# Patient Record
Sex: FEMALE | Race: WHITE | NOT HISPANIC OR LATINO | Employment: OTHER | URBAN - METROPOLITAN AREA
[De-identification: names, ages, dates, MRNs, and addresses within clinical notes are randomized per-mention and may not be internally consistent; named-entity substitution may affect disease eponyms.]

---

## 2017-01-10 ENCOUNTER — OFFICE VISIT (OUTPATIENT)
Dept: ONCOLOGY | Facility: CLINIC | Age: 67
End: 2017-01-10

## 2017-01-10 VITALS
DIASTOLIC BLOOD PRESSURE: 79 MMHG | HEIGHT: 67 IN | RESPIRATION RATE: 16 BRPM | WEIGHT: 150 LBS | BODY MASS INDEX: 23.54 KG/M2 | TEMPERATURE: 98.3 F | HEART RATE: 62 BPM | SYSTOLIC BLOOD PRESSURE: 157 MMHG

## 2017-01-10 DIAGNOSIS — C50.911 MALIGNANT NEOPLASM OF RIGHT FEMALE BREAST, UNSPECIFIED SITE OF BREAST: Primary | ICD-10-CM

## 2017-01-10 PROCEDURE — 99213 OFFICE O/P EST LOW 20 MIN: CPT | Performed by: INTERNAL MEDICINE

## 2017-01-10 RX ORDER — TRIAMCINOLONE ACETONIDE 55 UG/1
1 SPRAY, METERED NASAL DAILY
COMMUNITY
End: 2018-01-23 | Stop reason: ALTCHOICE

## 2017-01-10 NOTE — PROGRESS NOTES
"      PROBLEM LIST:  1. Right-sided Stage IA ([id number]) invasive ductal carcinoma, estrogen  receptor positive, progesterone receptor positive, HER-2 0+.   a) The patient presented with an abnormality on screening mammogram on  04/29/2015. On 05/05/2015 she had a biopsy which showed an invasive, low-grade  invasive ductal carcinoma, estrogen receptor positive, progesterone receptor  positive, HER-2 0+. On 05/27/2015 she underwent a right lumpectomy. Final  pathology showed a well differentiated invasive ductal cancer with tubular  features measuring 0.9 cm in size. No lymphovascular invasion. Zero of 1  sentinel lymph nodes was involved.   b) Anastrozole was started on 06/10/2015.   c) Hormonal therapy was changed to tamoxifen in 12/2015 due to joint symptoms  from anastrazole.    Subjective     HISTORY OF PRESENT ILLNESS:   Jia Mendoza returns for follow-up.  Since her last visit she has retired.  Shortly afterwards she fell and broke her wrist of her right hand.  She has been doing physical therapy and working on recovering from this.  She has been enjoying [employer].  Her  is planning on retiring this spring and they are looking forward to having some time together.  She had a mammogram last May and 12 month follow-up was recommended afterwards.  She is taking tamoxifen and tolerating it well.    Past Medical History, Past Surgical History, Social History, Family History have been reviewed and are without significant changes except as mentioned.    Review of Systems   A comprehensive 14 point review of systems was performed and was negative except as mentioned.    Medications:  The current medication list was reviewed in the EMR    ALLERGIES:    Allergies   Allergen Reactions   • Erythromycin        Objective      Visit Vitals   • /79   • Pulse 62   • Temp 98.3 °F (36.8 °C)   • Resp 16   • Ht 67\" (170.2 cm)   • Wt 150 lb (68 kg)   • BMI 23.49 kg/m2        Performance Status: 0    General: well " appearing, in no acute distress  HEENT: sclera anicteric, oropharynx clear  Lymphatics: no cervical, supraclavicular, or axillary adenopathy  Cardiovascular: regular rate and rhythm, no murmurs  Lungs: clear to auscultation bilaterally  Breast: Bilateral breast exam was performed.  There are no masses or lesions  Abdomen: soft, nontender, nondistended.  No palpable organomegaly  Extremeties: no lower extremity edema  Skin: no rashes, lesions, bruising, or petechiae          Assessment/Plan   Jia Mendoza is a 66-year-old female who returns for follow-up of stage I breast cancer.  She continues on tamoxifen which she is tolerating well without difficulty.  We will plan to continue this for a minimum of 5 years.  She will be due for repeat mammogram in May 2017.  She will notify me or Dr. Knapp if she does not hear from the breast center regarding scheduling this.  I will plan to see her back again in 6 months.                  Visit time was 15 minutes, greater than 50% spent in counseling      Ashlie Thompson MD  [facility] Hematology and Oncology    1/10/2017          CC:

## 2017-01-10 NOTE — MR AVS SNAPSHOT
Jia Mendoza   1/10/2017 10:15 AM   Office Visit    Dept Phone:  [phone]   Encounter #:  [id number]    Provider:  Ashlie Thompson MD   Department:  [facility] HEMATOLOGY  AND ONCOLOGY                Your Full Care Plan              Your Updated Medication List          This list is accurate as of: 1/10/17 11:19 AM.  Always use your most recent med list.                aspirin 81 MG EC tablet       METAMUCIL PO       tamoxifen 20 MG chemo tablet   Commonly known as:  NOLVADEX   TAKE 1 TABLET BY MOUTH DAILY       Triamcinolone Acetonide 55 MCG/ACT nasal inhaler   Commonly known as:  NASACORT       VITAMIN D PO       ZYRTEC PO               Instructions     None    Patient Instructions History      "[employer]hart Signup     [facility] [employer] allows you to send messages to your doctor, view your test results, renew your prescriptions, schedule appointments, and more. To sign up, go to [employer] and click on the Sign Up Now link in the New User? box. Enter your Infomous Activation Code exactly as it appears below along with the last four digits of your Social Security Number and your Date of Birth () to complete the sign-up process. If you do not sign up before the expiration date, you must request a new code.    Infomous Activation Code: [email or web address]  Expires: 2017 11:17 AM    If you have questions, you can email [email or web address] or call [phone] to talk to our Infomous staff. Remember, Infomous is NOT to be used for urgent needs. For medical emergencies, dial 911.               Other Info from Your Visit           Your appointments     Date & Time Provider Appointment Department    2017 11:00 AM EDT Ashlie Thompson MD FOLLOW UP [facility] HEMATOLOGY  AND ONCOLOGY        [facility] HEMATOLOGY  AND ONCOLOGY  [address], [address] [address]  [phone]           "  Vital Signs     Blood Pressure Pulse Temperature Respirations Height Weight    157/79 62 98.3 °F (36.8 °C) 16 67\" (170.2 cm) 150 lb (68 kg)    Body Mass Index Smoking Status                23.49 kg/m2 Never Smoker          Problems and Diagnoses Noted     Breast cancer        "

## 2017-03-06 ENCOUNTER — TRANSCRIBE ORDERS (OUTPATIENT)
Dept: ADMINISTRATIVE | Facility: HOSPITAL | Age: 67
End: 2017-03-06

## 2017-03-06 DIAGNOSIS — R92.8 ABNORMAL MAMMOGRAPHY: Primary | ICD-10-CM

## 2017-05-12 ENCOUNTER — HOSPITAL ENCOUNTER (OUTPATIENT)
Dept: MAMMOGRAPHY | Facility: HOSPITAL | Age: 67
Discharge: HOME | End: 2017-05-12
Attending: SURGERY | Admitting: SURGERY

## 2017-05-12 DIAGNOSIS — R92.8 ABNORMAL MAMMOGRAPHY: ICD-10-CM

## 2017-05-12 PROCEDURE — G0204 DX MAMMO INCL CAD BI: HCPCS | Performed by: RADIOLOGY

## 2017-05-12 PROCEDURE — G0204 DX MAMMO INCL CAD BI: HCPCS

## 2017-05-12 PROCEDURE — G0279 TOMOSYNTHESIS, MAMMO: HCPCS | Performed by: RADIOLOGY

## 2017-05-12 PROCEDURE — G0279 TOMOSYNTHESIS, MAMMO: HCPCS

## 2017-06-01 ENCOUNTER — TELEPHONE (OUTPATIENT)
Dept: ONCOLOGY | Facility: CLINIC | Age: 67
End: 2017-06-01

## 2017-06-01 NOTE — TELEPHONE ENCOUNTER
----- Message from Iram Ponce sent at 5/31/2017 12:56 PM EDT -----  Regarding: MICK THACKER  Contact: [phone]  PT CALLED AND SAID HER GYN WANTS TO PUT HER ON CALTRATE.    PLEASE CALL HER AND LET HER KNOW IF THIS IS OKAY.

## 2017-07-18 ENCOUNTER — OFFICE VISIT (OUTPATIENT)
Dept: ONCOLOGY | Facility: CLINIC | Age: 67
End: 2017-07-18

## 2017-07-18 VITALS
RESPIRATION RATE: 16 BRPM | DIASTOLIC BLOOD PRESSURE: 76 MMHG | SYSTOLIC BLOOD PRESSURE: 157 MMHG | BODY MASS INDEX: 23.54 KG/M2 | TEMPERATURE: 97.5 F | HEART RATE: 65 BPM | HEIGHT: 67 IN | WEIGHT: 150 LBS

## 2017-07-18 DIAGNOSIS — C50.911 MALIGNANT NEOPLASM OF RIGHT FEMALE BREAST, UNSPECIFIED SITE OF BREAST: Primary | ICD-10-CM

## 2017-07-18 PROCEDURE — 99213 OFFICE O/P EST LOW 20 MIN: CPT | Performed by: INTERNAL MEDICINE

## 2017-07-18 NOTE — PROGRESS NOTES
"      PROBLEM LIST:  1. Right-sided Stage IA ([id number]) invasive ductal carcinoma, estrogen  receptor positive, progesterone receptor positive, HER-2 0+.   a) The patient presented with an abnormality on screening mammogram on  04/29/2015. On 05/05/2015 she had a biopsy which showed an invasive, low-grade  invasive ductal carcinoma, estrogen receptor positive, progesterone receptor  positive, HER-2 0+. On 05/27/2015 she underwent a right lumpectomy. Final  pathology showed a well differentiated invasive ductal cancer with tubular  features measuring 0.9 cm in size. No lymphovascular invasion. Zero of 1  sentinel lymph nodes was involved.   b) Anastrozole was started on 06/10/2015.   c) Hormonal therapy was changed to tamoxifen in 12/2015 due to joint symptoms  from anastrazole.    Subjective     HISTORY OF PRESENT ILLNESS:   Jia Mendoza returns for follow-up.  She has been feeling pretty well.  She does have some joint aches but they are manageable.  She continues to take tamoxifen regularly.  She saw her gynecologist a few months ago and had a breast exam at that time.    Past Medical History, Past Surgical History, Social History, Family History have been reviewed and are without significant changes except as mentioned.    Review of Systems   A comprehensive 14 point review of systems was performed and was negative except as mentioned.    Medications:  The current medication list was reviewed in the EMR    ALLERGIES:    Allergies   Allergen Reactions   • Erythromycin        Objective      /76 Comment: LUE  Pulse 65  Temp 97.5 °F (36.4 °C) (Temporal Artery )   Resp 16  Ht 67\" (170.2 cm)  Wt 150 lb (68 kg)  BMI 23.49 kg/m2     Performance Status: 0    General: well appearing female in no acute distress  Neuro: alert and oriented  HEENT: sclera anicteric, oropharynx clear  Lymphatics: no cervical, supraclavicular, or axillary adenopathy  Cardiovascular: regular rate and rhythm, no murmurs  Lungs: clear to " auscultation bilaterally  Abdomen: soft, nontender, nondistended.  No palpable organomegaly  Extremeties: no lower extremity edema  Skin: no rashes, lesions, bruising, or petechiae  Psych: mood and affect appropriate        Mammogram 5/12/17: category 2 - benign.    Assessment/Plan   Jia Mendoza is a 67 y.o.  female who returns for follow-up of stage I breast cancer.  She is on tamoxifen and tolerating it reasonably well.  We discussed that her joint aches may be in part related to tamoxifen.  She does not have any evidence of cancer recurrence at this time.  She will be due for mammogram in May 2018.  We are planning for a total of 5 years of tamoxifen.  We discussed that a longer duration of adjuvant therapy can be considered for higher risk breast cancers, but I think the magnitude of benefit in her situation is likely to be small.  I will see her back again in 6 months.                Visit time was 15 minutes, greater than 50% spent in counseling      Ashlie Thompson MD  [facility] Hematology and Oncology    7/18/2017          CC:

## 2017-08-22 RX ORDER — TAMOXIFEN CITRATE 20 MG/1
TABLET ORAL
Qty: 90 TABLET | Refills: 3 | Status: SHIPPED | OUTPATIENT
Start: 2017-08-22 | End: 2018-07-31 | Stop reason: SDUPTHER

## 2018-01-23 ENCOUNTER — OFFICE VISIT (OUTPATIENT)
Dept: ONCOLOGY | Facility: CLINIC | Age: 68
End: 2018-01-23

## 2018-01-23 VITALS
TEMPERATURE: 98.3 F | RESPIRATION RATE: 16 BRPM | WEIGHT: 151 LBS | DIASTOLIC BLOOD PRESSURE: 86 MMHG | HEIGHT: 67 IN | SYSTOLIC BLOOD PRESSURE: 150 MMHG | BODY MASS INDEX: 23.7 KG/M2 | HEART RATE: 71 BPM

## 2018-01-23 DIAGNOSIS — Z17.0 MALIGNANT NEOPLASM OF RIGHT BREAST IN FEMALE, ESTROGEN RECEPTOR POSITIVE, UNSPECIFIED SITE OF BREAST (HCC): Primary | ICD-10-CM

## 2018-01-23 DIAGNOSIS — C50.911 MALIGNANT NEOPLASM OF RIGHT BREAST IN FEMALE, ESTROGEN RECEPTOR POSITIVE, UNSPECIFIED SITE OF BREAST (HCC): Primary | ICD-10-CM

## 2018-01-23 PROCEDURE — 99213 OFFICE O/P EST LOW 20 MIN: CPT | Performed by: INTERNAL MEDICINE

## 2018-01-23 RX ORDER — FLUTICASONE PROPIONATE 50 MCG
SPRAY, SUSPENSION (ML) NASAL
Refills: 11 | COMMUNITY
Start: 2017-12-27 | End: 2018-07-31

## 2018-01-23 NOTE — PROGRESS NOTES
"      PROBLEM LIST:  1. Right-sided Stage IA ([id number]) invasive ductal carcinoma, estrogen  receptor positive, progesterone receptor positive, HER-2 0+.   a) The patient presented with an abnormality on screening mammogram on  04/29/2015. On 05/05/2015 she had a biopsy which showed an invasive, low-grade  invasive ductal carcinoma, estrogen receptor positive, progesterone receptor  positive, HER-2 0+. On 05/27/2015 she underwent a right lumpectomy. Final  pathology showed a well differentiated invasive ductal cancer with tubular  features measuring 0.9 cm in size. No lymphovascular invasion. Zero of 1  sentinel lymph nodes was involved.   b) Anastrozole was started on 06/10/2015.   c) Hormonal therapy was changed to tamoxifen in 12/2015 due to joint symptoms  from anastrazole.    Subjective     HISTORY OF PRESENT ILLNESS:   Jia Mendoza returns for follow-up.  She says she is doing well.  She says last fall she was having a lot of joint aches but this is actually gotten better.  She is active and exercising most days with her .  She continues to enjoy her [employer].    Past Medical History, Past Surgical History, Social History, Family History have been reviewed and are without significant changes except as mentioned.    Review of Systems   A comprehensive 14 point review of systems was performed and was negative except as mentioned.    Medications:  The current medication list was reviewed in the EMR    ALLERGIES:    Allergies   Allergen Reactions   • Erythromycin        Objective      /86 Comment: LUE  Pulse 71  Temp 98.3 °F (36.8 °C) (Oral)   Resp 16  Ht 170.2 cm (67\")  Wt 68.5 kg (151 lb)  BMI 23.65 kg/m2     Performance Status: 0    General: well appearing female in no acute distress  Neuro: alert and oriented  HEENT: sclera anicteric, oropharynx clear  Lymphatics: no cervical, supraclavicular, or axillary adenopathy\  Breast: Bilateral breast exam performed.  Well-healed incision in the " right breast.  No palpable masses  Cardiovascular: regular rate and rhythm, no murmurs  Lungs: clear to auscultation bilaterally  Abdomen: soft, nontender, nondistended.  No palpable organomegaly  Extremeties: no lower extremity edema  Skin: no rashes, lesions, bruising, or petechiae  Psych: mood and affect appropriate        Assessment/Plan   Jia Mendoza is a 67 y.o.  female who returns for follow-up of stage I breast cancer.  She is on tamoxifen and tolerating it reasonably well.  She is doing well and has no evidence of disease recurrence at this time.  We will plan to continue tamoxifen for a total of 5 years which will be in 2020.  She is due for repeat mammogram in May.  I will see her back in 6 months.                Visit time was 15 minutes, greater than 50% spent in counseling      Ashlie Thompson MD  [facility] Hematology and Oncology    1/23/2018          CC:

## 2018-04-02 ENCOUNTER — TRANSCRIBE ORDERS (OUTPATIENT)
Dept: ADMINISTRATIVE | Facility: HOSPITAL | Age: 68
End: 2018-04-02

## 2018-04-02 DIAGNOSIS — Z12.31 VISIT FOR SCREENING MAMMOGRAM: Primary | ICD-10-CM

## 2018-05-16 ENCOUNTER — HOSPITAL ENCOUNTER (OUTPATIENT)
Dept: MAMMOGRAPHY | Facility: HOSPITAL | Age: 68
Discharge: HOME | End: 2018-05-16
Attending: OBSTETRICS & GYNECOLOGY | Admitting: OBSTETRICS & GYNECOLOGY

## 2018-05-16 DIAGNOSIS — Z12.31 VISIT FOR SCREENING MAMMOGRAM: ICD-10-CM

## 2018-05-16 PROCEDURE — 77067 SCR MAMMO BI INCL CAD: CPT | Performed by: RADIOLOGY

## 2018-05-16 PROCEDURE — 77063 BREAST TOMOSYNTHESIS BI: CPT

## 2018-05-16 PROCEDURE — 77063 BREAST TOMOSYNTHESIS BI: CPT | Performed by: RADIOLOGY

## 2018-05-16 PROCEDURE — 77067 SCR MAMMO BI INCL CAD: CPT

## 2018-07-30 NOTE — PROGRESS NOTES
"      PROBLEM LIST:  1. Right-sided Stage IA ([id number]) invasive ductal carcinoma, estrogen  receptor positive, progesterone receptor positive, HER-2 0+.   a) The patient presented with an abnormality on screening mammogram on  04/29/2015. On 05/05/2015 she had a biopsy which showed an invasive, low-grade  invasive ductal carcinoma, estrogen receptor positive, progesterone receptor  positive, HER-2 0+. On 05/27/2015 she underwent a right lumpectomy. Final  pathology showed a well differentiated invasive ductal cancer with tubular  features measuring 0.9 cm in size. No lymphovascular invasion. Zero of 1  sentinel lymph nodes was involved.   b) Anastrozole was started on 06/10/2015.   c) Hormonal therapy was changed to tamoxifen in 12/2015 due to joint symptoms  from anastrazole.    Subjective     HISTORY OF PRESENT ILLNESS:   Jia Mendoza returns for follow-up.  She has been feeling well.  She continues to take tamoxifen.  She is not having any major side effects from this.  She has noticed that the hair on the top of her head has been some.    Past Medical History, Past Surgical History, Social History, Family History have been reviewed and are without significant changes except as mentioned.    Review of Systems   A comprehensive 14 point review of systems was performed and was negative except as mentioned.    Medications:  The current medication list was reviewed in the EMR    ALLERGIES:    Allergies   Allergen Reactions   • Erythromycin        Objective      /74   Pulse 67   Temp 97.7 °F (36.5 °C)   Resp 16   Ht 170.2 cm (67\")   Wt 68 kg (150 lb)   BMI 23.49 kg/m²      Performance Status: 0    General: well appearing female in no acute distress  Neuro: alert and oriented  HEENT: sclera anicteric, oropharynx clear  Lymphatics: no cervical, supraclavicular, or axillary adenopathy\  Breast: Bilateral breast exam performed.  Well-healed incision in the right breast.  No palpable masses  Cardiovascular: " regular rate and rhythm, no murmurs  Lungs: clear to auscultation bilaterally  Abdomen: soft, nontender, nondistended.  No palpable organomegaly  Extremeties: no lower extremity edema  Skin: no rashes, lesions, bruising, or petechiae  Psych: mood and affect appropriate    Mammogram in May - category 2 , 1 year f/u recommended.    Assessment/Plan   Jia Mendoza is a 68 y.o.  female who returns for follow-up of stage I breast cancer.  She continues on tamoxifen which we'll plan to continue for a total of 5 years.  We discussed that for some higher risk cancers longer duration may be indicated, but I don't think that's necessary in her case.  I reminded her that if she has any elective surgeries I would recommend stopping tamoxifen for about 2 weeks around the time of the procedure.  I will see her back again in 6 months.              Visit time was 15 minutes, greater than 50% spent in counseling      Ashlie Thompson MD  [facility] Hematology and Oncology    7/31/2018          CC:

## 2018-07-31 ENCOUNTER — OFFICE VISIT (OUTPATIENT)
Dept: ONCOLOGY | Facility: CLINIC | Age: 68
End: 2018-07-31

## 2018-07-31 VITALS
BODY MASS INDEX: 23.54 KG/M2 | RESPIRATION RATE: 16 BRPM | DIASTOLIC BLOOD PRESSURE: 74 MMHG | SYSTOLIC BLOOD PRESSURE: 140 MMHG | TEMPERATURE: 97.7 F | HEIGHT: 67 IN | WEIGHT: 150 LBS | HEART RATE: 67 BPM

## 2018-07-31 DIAGNOSIS — Z17.0 MALIGNANT NEOPLASM OF RIGHT BREAST IN FEMALE, ESTROGEN RECEPTOR POSITIVE, UNSPECIFIED SITE OF BREAST (HCC): Primary | ICD-10-CM

## 2018-07-31 DIAGNOSIS — C50.911 MALIGNANT NEOPLASM OF RIGHT BREAST IN FEMALE, ESTROGEN RECEPTOR POSITIVE, UNSPECIFIED SITE OF BREAST (HCC): Primary | ICD-10-CM

## 2018-07-31 PROCEDURE — 99213 OFFICE O/P EST LOW 20 MIN: CPT | Performed by: INTERNAL MEDICINE

## 2018-07-31 RX ORDER — TAMOXIFEN CITRATE 20 MG/1
20 TABLET ORAL DAILY
Qty: 90 TABLET | Refills: 3 | Status: SHIPPED | OUTPATIENT
Start: 2018-07-31 | End: 2019-08-05 | Stop reason: SDUPTHER

## 2018-08-13 RX ORDER — TAMOXIFEN CITRATE 20 MG/1
TABLET ORAL
Qty: 90 TABLET | Refills: 0 | Status: SHIPPED | OUTPATIENT
Start: 2018-08-13 | End: 2019-11-06 | Stop reason: SDUPTHER

## 2019-02-07 NOTE — PROGRESS NOTES
"      PROBLEM LIST:  1. Right-sided Stage IA ([id number]) invasive ductal carcinoma, estrogen  receptor positive, progesterone receptor positive, HER-2 0+.   a) The patient presented with an abnormality on screening mammogram on  04/29/2015. On 05/05/2015 she had a biopsy which showed an invasive, low-grade  invasive ductal carcinoma, estrogen receptor positive, progesterone receptor  positive, HER-2 0+. On 05/27/2015 she underwent a right lumpectomy. Final  pathology showed a well differentiated invasive ductal cancer with tubular  features measuring 0.9 cm in size. No lymphovascular invasion. Zero of 1  sentinel lymph nodes was involved.   b) Anastrozole was started on 06/10/2015.   c) Hormonal therapy was changed to tamoxifen in 12/2015 due to joint symptoms  from anastrazole.    Subjective     HISTORY OF PRESENT ILLNESS:   Jia Mendoza returns for follow-up.  She says she has been feeling well.  She and her  continue to enjoy their alf and they stay busy.  She stays active and walks her dog frequently.  She says she would like to lose some weight but hasn't been successful in doing so.    Past Medical History, Past Surgical History, Social History, Family History have been reviewed and are without significant changes except as mentioned.    Review of Systems   A comprehensive 14 point review of systems was performed and was negative except as mentioned.    Medications:  The current medication list was reviewed in the EMR    ALLERGIES:    Allergies   Allergen Reactions   • Erythromycin        Objective      /74 Comment: LUE  Pulse 67   Temp 97.2 °F (36.2 °C) (Temporal)   Resp 16   Ht 170.2 cm (67\")   Wt 69.4 kg (153 lb)   SpO2 96% Comment: RA  BMI 23.96 kg/m²      Performance Status: 0    General: well appearing female in no acute distress  Neuro: alert and oriented  HEENT: sclera anicteric, oropharynx clear  Lymphatics: no cervical, supraclavicular, or axillary adenopathy  Breast: " Bilateral breast exam performed.  Well-healed incision in the right breast.  No palpable masses  Cardiovascular: regular rate and rhythm, no murmurs  Lungs: clear to auscultation bilaterally  Abdomen: soft, nontender, nondistended.  No palpable organomegaly  Extremeties: no lower extremity edema  Skin: no rashes, lesions, bruising, or petechiae  Psych: mood and affect appropriate      Assessment/Plan   Jia Mendoza is a 68 y.o.  female who returns for follow-up of stage I breast cancer.  She continues on tamoxifen which we'll plan to continue for a total of 5 years, or until June 2020.  She is doing very well and has no evidence of cancer recurrence.  We discussed that based on her tumor characteristics she is very low risk for recurrence, likely less than 5%.    F/u 6 months.             I spent 15 minutes with the patient. I spent > 50% percent of this time counseling and discussing prognosis, diagnostic testing, evaluation, current status and management.      Ashlie Thompson MD  [facility] Hematology and Oncology    2/8/2019          CC:

## 2019-02-08 ENCOUNTER — OFFICE VISIT (OUTPATIENT)
Dept: ONCOLOGY | Facility: CLINIC | Age: 69
End: 2019-02-08

## 2019-02-08 VITALS
HEIGHT: 67 IN | OXYGEN SATURATION: 96 % | BODY MASS INDEX: 24.01 KG/M2 | DIASTOLIC BLOOD PRESSURE: 74 MMHG | WEIGHT: 153 LBS | RESPIRATION RATE: 16 BRPM | HEART RATE: 67 BPM | SYSTOLIC BLOOD PRESSURE: 159 MMHG | TEMPERATURE: 97.2 F

## 2019-02-08 DIAGNOSIS — Z17.0 MALIGNANT NEOPLASM OF RIGHT BREAST IN FEMALE, ESTROGEN RECEPTOR POSITIVE, UNSPECIFIED SITE OF BREAST (HCC): Primary | ICD-10-CM

## 2019-02-08 DIAGNOSIS — C50.911 MALIGNANT NEOPLASM OF RIGHT BREAST IN FEMALE, ESTROGEN RECEPTOR POSITIVE, UNSPECIFIED SITE OF BREAST (HCC): Primary | ICD-10-CM

## 2019-02-08 PROCEDURE — 99213 OFFICE O/P EST LOW 20 MIN: CPT | Performed by: INTERNAL MEDICINE

## 2019-04-04 ENCOUNTER — TRANSCRIBE ORDERS (OUTPATIENT)
Dept: ADMINISTRATIVE | Facility: HOSPITAL | Age: 69
End: 2019-04-04

## 2019-04-04 DIAGNOSIS — Z12.31 VISIT FOR SCREENING MAMMOGRAM: Primary | ICD-10-CM

## 2019-05-17 ENCOUNTER — HOSPITAL ENCOUNTER (OUTPATIENT)
Dept: MAMMOGRAPHY | Facility: HOSPITAL | Age: 69
Discharge: HOME | End: 2019-05-17
Admitting: OBSTETRICS & GYNECOLOGY

## 2019-05-17 DIAGNOSIS — Z12.31 VISIT FOR SCREENING MAMMOGRAM: ICD-10-CM

## 2019-05-17 PROCEDURE — 77063 BREAST TOMOSYNTHESIS BI: CPT | Performed by: RADIOLOGY

## 2019-05-17 PROCEDURE — 77067 SCR MAMMO BI INCL CAD: CPT | Performed by: RADIOLOGY

## 2019-05-17 PROCEDURE — 77067 SCR MAMMO BI INCL CAD: CPT

## 2019-05-17 PROCEDURE — 77063 BREAST TOMOSYNTHESIS BI: CPT

## 2019-08-05 RX ORDER — TAMOXIFEN CITRATE 20 MG/1
20 TABLET ORAL DAILY
Qty: 90 TABLET | Refills: 0 | Status: SHIPPED | OUTPATIENT
Start: 2019-08-05 | End: 2019-08-14

## 2019-08-14 ENCOUNTER — OFFICE VISIT (OUTPATIENT)
Dept: ONCOLOGY | Facility: CLINIC | Age: 69
End: 2019-08-14

## 2019-08-14 VITALS
DIASTOLIC BLOOD PRESSURE: 79 MMHG | OXYGEN SATURATION: 97 % | HEART RATE: 62 BPM | BODY MASS INDEX: 24.48 KG/M2 | HEIGHT: 67 IN | RESPIRATION RATE: 18 BRPM | SYSTOLIC BLOOD PRESSURE: 171 MMHG | WEIGHT: 156 LBS | TEMPERATURE: 97.3 F

## 2019-08-14 DIAGNOSIS — Z17.0 MALIGNANT NEOPLASM OF RIGHT BREAST IN FEMALE, ESTROGEN RECEPTOR POSITIVE, UNSPECIFIED SITE OF BREAST (HCC): Primary | ICD-10-CM

## 2019-08-14 DIAGNOSIS — C50.911 MALIGNANT NEOPLASM OF RIGHT BREAST IN FEMALE, ESTROGEN RECEPTOR POSITIVE, UNSPECIFIED SITE OF BREAST (HCC): Primary | ICD-10-CM

## 2019-08-14 PROCEDURE — 99213 OFFICE O/P EST LOW 20 MIN: CPT | Performed by: INTERNAL MEDICINE

## 2019-08-14 NOTE — PROGRESS NOTES
"      PROBLEM LIST:  1. Right-sided Stage IA ([id number]) invasive ductal carcinoma, estrogen  receptor positive, progesterone receptor positive, HER-2 0+.   a) The patient presented with an abnormality on screening mammogram on  04/29/2015. On 05/05/2015 she had a biopsy which showed an invasive, low-grade  invasive ductal carcinoma, estrogen receptor positive, progesterone receptor  positive, HER-2 0+. On 05/27/2015 she underwent a right lumpectomy. Final  pathology showed a well differentiated invasive ductal cancer with tubular  features measuring 0.9 cm in size. No lymphovascular invasion. Zero of 1  sentinel lymph nodes was involved.   b) Anastrozole was started on 06/10/2015.   c) Hormonal therapy was changed to tamoxifen in 12/2015 due to joint symptoms  from anastrazole.    Subjective     HISTORY OF PRESENT ILLNESS:   Jia Mendoza returns for follow-up.  She continues to take tamoxifen.  She had a endometrial biopsy recently which was benign.  She is going to have a follow-up ultrasound in 3 months.  She occasionally has some joint aches and pains but overall says she is doing well.    Past Medical History, Past Surgical History, Social History, Family History have been reviewed and are without significant changes except as mentioned.    Review of Systems   A comprehensive 14 point review of systems was performed and was negative except as mentioned.    Medications:  The current medication list was reviewed in the EMR    ALLERGIES:    Allergies   Allergen Reactions   • Erythromycin        Objective      /79 Comment: LUE  Pulse 62   Temp 97.3 °F (36.3 °C) (Temporal)   Resp 18   Ht 170.2 cm (67\")   Wt 70.8 kg (156 lb)   SpO2 97% Comment: RA  BMI 24.43 kg/m²      Performance Status: 0    General: well appearing female in no acute distress  Neuro: alert and oriented  HEENT: sclera anicteric, oropharynx clear  Lymphatics: no cervical, supraclavicular, or axillary adenopathy  Breast: Bilateral breast " exam performed.  Well-healed incision in the right breast.  No palpable masses  Cardiovascular: regular rate and rhythm, no murmurs  Lungs: clear to auscultation bilaterally  Abdomen: soft, nontender, nondistended.  No palpable organomegaly  Extremeties: no lower extremity edema  Skin: no rashes, lesions, bruising, or petechiae  Psych: mood and affect appropriate    Mammogram May 2019 is category 2, 1 year follow-up recommended    Assessment/Plan   Jia Mendoza is a 69 y.o.  female who returns for follow-up of stage I breast cancer.  She continues on tamoxifen which we'll plan to continue for a total of 5 years, or until June 2020.  She is doing very well and has no evidence of cancer recurrence.     F/u 6 months.             I spent 15 minutes with the patient. I spent > 50% percent of this time counseling and discussing prognosis, diagnostic testing, evaluation, current status and management.      Ashlie Thompson MD  [facility] Hematology and Oncology    8/14/2019          CC:

## 2019-11-06 RX ORDER — TAMOXIFEN CITRATE 20 MG/1
20 TABLET ORAL DAILY
Qty: 90 TABLET | Refills: 1 | Status: SHIPPED | OUTPATIENT
Start: 2019-11-06 | End: 2020-02-12 | Stop reason: SDUPTHER

## 2020-02-12 ENCOUNTER — OFFICE VISIT (OUTPATIENT)
Dept: ONCOLOGY | Facility: CLINIC | Age: 70
End: 2020-02-12

## 2020-02-12 VITALS
HEART RATE: 66 BPM | BODY MASS INDEX: 25.11 KG/M2 | OXYGEN SATURATION: 97 % | TEMPERATURE: 97.8 F | HEIGHT: 67 IN | RESPIRATION RATE: 16 BRPM | WEIGHT: 160 LBS | SYSTOLIC BLOOD PRESSURE: 155 MMHG | DIASTOLIC BLOOD PRESSURE: 78 MMHG

## 2020-02-12 DIAGNOSIS — Z17.0 MALIGNANT NEOPLASM OF RIGHT BREAST IN FEMALE, ESTROGEN RECEPTOR POSITIVE, UNSPECIFIED SITE OF BREAST (HCC): Primary | ICD-10-CM

## 2020-02-12 DIAGNOSIS — C50.911 MALIGNANT NEOPLASM OF RIGHT BREAST IN FEMALE, ESTROGEN RECEPTOR POSITIVE, UNSPECIFIED SITE OF BREAST (HCC): Primary | ICD-10-CM

## 2020-02-12 PROCEDURE — 99213 OFFICE O/P EST LOW 20 MIN: CPT | Performed by: INTERNAL MEDICINE

## 2020-02-12 RX ORDER — TAMOXIFEN CITRATE 20 MG/1
20 TABLET ORAL DAILY
Qty: 90 TABLET | Refills: 0 | Status: SHIPPED | OUTPATIENT
Start: 2020-02-12 | End: 2021-06-23

## 2020-02-12 NOTE — PROGRESS NOTES
"      PROBLEM LIST:  1. Right-sided Stage IA ([id number]) invasive ductal carcinoma, estrogen  receptor positive, progesterone receptor positive, HER-2 0+.   a) The patient presented with an abnormality on screening mammogram on  04/29/2015. On 05/05/2015 she had a biopsy which showed an invasive, low-grade  invasive ductal carcinoma, estrogen receptor positive, progesterone receptor  positive, HER-2 0+. On 05/27/2015 she underwent a right lumpectomy. Final  pathology showed a well differentiated invasive ductal cancer with tubular  features measuring 0.9 cm in size. No lymphovascular invasion. Zero of 1  sentinel lymph nodes was involved.   b) Anastrozole was started on 06/10/2015.   c) Hormonal therapy was changed to tamoxifen in 12/2015 due to joint symptoms  from anastrazole.    Subjective     HISTORY OF PRESENT ILLNESS:   Jia Mendoza returns for follow-up.  She says she is doing well.  She will complete 5 years of endocrine therapy in June.  She says that she is looking forward to stopping.    Past Medical History, Past Surgical History, Social History, Family History have been reviewed and are without significant changes except as mentioned.    Review of Systems   A comprehensive 14 point review of systems was performed and was negative except as mentioned.    Medications:  The current medication list was reviewed in the EMR    ALLERGIES:    Allergies   Allergen Reactions   • Erythromycin        Objective      /78   Pulse 66   Temp 97.8 °F (36.6 °C)   Resp 16   Ht 170.2 cm (67\")   Wt 72.6 kg (160 lb)   SpO2 97%   BMI 25.06 kg/m²      Performance Status: 0    General: well appearing female in no acute distress  Neuro: alert and oriented  HEENT: sclera anicteric, oropharynx clear  Lymphatics: no cervical, supraclavicular, or axillary adenopathy  Breast: Bilateral breast exam performed.  Well-healed incision in the right breast.  No palpable masses  Cardiovascular: regular rate and rhythm, no " murmurs  Lungs: clear to auscultation bilaterally  Abdomen: soft, nontender, nondistended.  No palpable organomegaly  Extremeties: no lower extremity edema  Skin: no rashes, lesions, bruising, or petechiae  Psych: mood and affect appropriate        Assessment/Plan   Jia Mendoza is a 69 y.o.  female who returns for follow-up of stage I breast cancer.  She will complete 5 years of endocrine therapy in June.  We discussed that there have been studies showing benefit of a longer duration of endocrine treatment, but I think this benefit is most likely to be significant in patients with high risk disease, which she does not have.    Going forward she will follow-up with her primary care doctor.  I am happy to see her at any point in the future if needed.               I spent 15 minutes with the patient. I spent > 50% percent of this time counseling and discussing prognosis, diagnostic testing, evaluation, current status and management.      Ashlie Thompson MD  [facility] Hematology and Oncology    2/12/2020          CC:

## 2020-03-16 ENCOUNTER — TRANSCRIBE ORDERS (OUTPATIENT)
Dept: ADMINISTRATIVE | Facility: HOSPITAL | Age: 70
End: 2020-03-16

## 2020-03-16 DIAGNOSIS — Z12.31 VISIT FOR SCREENING MAMMOGRAM: Primary | ICD-10-CM

## 2020-05-18 ENCOUNTER — HOSPITAL ENCOUNTER (OUTPATIENT)
Dept: MAMMOGRAPHY | Facility: HOSPITAL | Age: 70
Discharge: HOME | End: 2020-05-18
Admitting: OBSTETRICS & GYNECOLOGY

## 2020-05-18 DIAGNOSIS — Z12.31 VISIT FOR SCREENING MAMMOGRAM: ICD-10-CM

## 2020-05-18 PROCEDURE — 77067 SCR MAMMO BI INCL CAD: CPT | Performed by: RADIOLOGY

## 2020-05-18 PROCEDURE — 77063 BREAST TOMOSYNTHESIS BI: CPT | Performed by: RADIOLOGY

## 2020-05-18 PROCEDURE — 77063 BREAST TOMOSYNTHESIS BI: CPT

## 2020-05-18 PROCEDURE — 77067 SCR MAMMO BI INCL CAD: CPT

## 2021-04-05 ENCOUNTER — TRANSCRIBE ORDERS (OUTPATIENT)
Dept: ADMINISTRATIVE | Facility: HOSPITAL | Age: 71
End: 2021-04-05

## 2021-04-05 DIAGNOSIS — Z12.31 VISIT FOR SCREENING MAMMOGRAM: Primary | ICD-10-CM

## 2021-05-20 ENCOUNTER — HOSPITAL ENCOUNTER (OUTPATIENT)
Dept: MAMMOGRAPHY | Facility: HOSPITAL | Age: 71
Discharge: HOME | End: 2021-05-20
Admitting: OBSTETRICS & GYNECOLOGY

## 2021-05-20 DIAGNOSIS — Z12.31 VISIT FOR SCREENING MAMMOGRAM: ICD-10-CM

## 2021-05-20 PROCEDURE — 77063 BREAST TOMOSYNTHESIS BI: CPT | Performed by: RADIOLOGY

## 2021-05-20 PROCEDURE — 77067 SCR MAMMO BI INCL CAD: CPT | Performed by: RADIOLOGY

## 2021-05-20 PROCEDURE — 77067 SCR MAMMO BI INCL CAD: CPT

## 2021-05-20 PROCEDURE — 77063 BREAST TOMOSYNTHESIS BI: CPT

## 2021-06-23 ENCOUNTER — OFFICE VISIT (OUTPATIENT)
Dept: OBSTETRICS AND GYNECOLOGY | Facility: CLINIC | Age: 71
End: 2021-06-23

## 2021-06-23 VITALS
DIASTOLIC BLOOD PRESSURE: 76 MMHG | SYSTOLIC BLOOD PRESSURE: 110 MMHG | HEIGHT: 67 IN | BODY MASS INDEX: 24.48 KG/M2 | WEIGHT: 156 LBS

## 2021-06-23 DIAGNOSIS — Z01.419 WOMEN'S ANNUAL ROUTINE GYNECOLOGICAL EXAMINATION: ICD-10-CM

## 2021-06-23 DIAGNOSIS — C50.911 MALIGNANT NEOPLASM OF RIGHT FEMALE BREAST, UNSPECIFIED ESTROGEN RECEPTOR STATUS, UNSPECIFIED SITE OF BREAST (HCC): ICD-10-CM

## 2021-06-23 DIAGNOSIS — C50.911 MALIGNANT NEOPLASM OF RIGHT FEMALE BREAST, UNSPECIFIED ESTROGEN RECEPTOR STATUS, UNSPECIFIED SITE OF BREAST (HCC): Primary | ICD-10-CM

## 2021-06-23 PROCEDURE — G0101 CA SCREEN;PELVIC/BREAST EXAM: HCPCS | Performed by: OBSTETRICS & GYNECOLOGY

## 2021-06-23 NOTE — PROGRESS NOTES
GYN Annual Exam     CC - Here for annual exam.     Subjective   HPI  Jia Mendoza is a 71 y.o. female, , who presents for annual well woman exam.  She is postmenopausal. She is not on hormone replacement therapy. Partner Status: Marital Status: .  New Partners since last visit: no Desires STD Screening: no    Pt is s/p lumpectomy in  on (R) breast for breast CA.  She completed Tamoxifen last .     Patient reports that she is not currently experiencing any symptoms of urinary incontinence.    Last mammogram: wnl   Last Completed Mammogram          MAMMOGRAM (Yearly) Next due on 2021  Mammo Screening Digital Tomosynthesis Bilateral With CAD    2020  Mammo Screening Digital Tomosynthesis Bilateral With CAD    2019  Mammo Screening Digital Tomosynthesis Bilateral With CAD    2018  Mammo Screening Digital Tomosynthesis Bilateral With CAD    2017  Mammo Diagnostic Digital Tomosynthesis Bilateral With CAD    Only the first 5 history entries have been loaded, but more history exists.              Last colonoscopy: ; wnl   Last Completed Colonoscopy     This patient has no relevant Health Maintenance data.        Last DEXA: 10/20 osteopenia     Last Pap : ; wnl   Last Completed Pap Smear     This patient has no relevant Health Maintenance data.        History of abnormal Pap smear: no    Additional OB/GYN History   Family history of uterine, colon, breast, or ovarian cancer: yes - maternal aunt +breast CA  Performs monthly Self-Breast Exam: yes - .  Exercises Regularly: yes - .  Feelings of Anxiety or Depression: no    Tobacco Usage?: No   OB History        1    Para   1    Term   1            AB        Living   2       SAB        TAB        Ectopic        Molar        Multiple   1    Live Births   2                Health Maintenance   Topic Date Due   • ZOSTER VACCINE (1 of 2) Never done   • Pneumococcal Vaccine 65+ (1 of 1 - PPSV23)  "Never done   • HEPATITIS C SCREENING  Never done   • ANNUAL WELLNESS VISIT  Never done   • INFLUENZA VACCINE  08/01/2021   • MAMMOGRAM  05/20/2022   • DXA SCAN  10/29/2022   • COLORECTAL CANCER SCREENING  01/01/2025   • TDAP/TD VACCINES (2 - Td or Tdap) 01/26/2026   • COVID-19 Vaccine  Completed       The additional following portions of the patient's history were reviewed and updated as appropriate: allergies, current medications, past family history, past medical history, past social history, past surgical history and problem list.    Review of Systems   Constitutional: Negative.    HENT: Negative.    Eyes: Negative.    Respiratory: Negative.    Cardiovascular: Negative.    Gastrointestinal: Negative.    Endocrine: Negative.    Genitourinary: Negative.    Musculoskeletal: Negative.    Skin: Negative.    Allergic/Immunologic: Negative.    Neurological: Negative.    Hematological: Negative.    Psychiatric/Behavioral: Negative.        I have reviewed and agree with the HPI, ROS, and historical information as entered above. Ashtyn Thompson MD    Objective   /76   Ht 170.2 cm (67\")   Wt 70.8 kg (156 lb)   Breastfeeding No   BMI 24.43 kg/m²     Physical Exam  Vitals and nursing note reviewed. Exam conducted with a chaperone present.   Constitutional:       Appearance: She is well-developed.   HENT:      Head: Normocephalic and atraumatic.   Eyes:      Pupils: Pupils are equal, round, and reactive to light.   Neck:      Thyroid: No thyroid mass or thyromegaly.   Pulmonary:      Effort: Pulmonary effort is normal. No retractions.   Chest:      Chest wall: No mass.      Breasts:         Right: Normal. No mass, nipple discharge, skin change or tenderness.         Left: Normal. No mass, nipple discharge, skin change or tenderness.   Abdominal:      General: Bowel sounds are normal.      Palpations: Abdomen is soft. Abdomen is not rigid. There is no mass.      Tenderness: There is no abdominal tenderness. There is no " guarding.      Hernia: No hernia is present. There is no hernia in the left inguinal area or right inguinal area.   Genitourinary:     Exam position: Lithotomy position.      Pubic Area: No rash.       Labia:         Right: No rash, tenderness or lesion.         Left: No rash, tenderness or lesion.       Urethra: No urethral pain or urethral swelling.      Vagina: Normal. No vaginal discharge or lesions.      Cervix: No cervical motion tenderness, discharge, lesion or cervical bleeding.      Uterus: Normal. Not enlarged, not fixed and not tender.       Adnexa:         Right: No mass, tenderness or fullness.          Left: No mass, tenderness or fullness.        Rectum: No external hemorrhoid.   Musculoskeletal:      Cervical back: Normal range of motion. No muscular tenderness.      Right lower leg: No edema.      Left lower leg: No edema.   Skin:     General: Skin is warm and dry.   Neurological:      Mental Status: She is alert and oriented to person, place, and time.      Motor: Motor function is intact.   Psychiatric:         Mood and Affect: Mood and affect normal.         Behavior: Behavior normal.           Assessment/Plan     Encounter Diagnoses   Name Primary?   • Malignant neoplasm of right female breast, unspecified estrogen receptor status, unspecified site of breast (CMS/HCC) Yes   • Women's annual routine gynecological examination        Recommended use of Vitamin D replacement and getting adequate calcium in her diet. (1500mg).     Reviewed HPV guidelines  Reviewed monthly self breast exams.  Instructed to call with lumps, pain, or breast discharge.  Yearly mammograms ordered.  RTC in 1 year or PRN with problems    Ashtyn Thompson MD   06/23/2021

## 2022-04-05 ENCOUNTER — TRANSCRIBE ORDERS (OUTPATIENT)
Dept: ADMINISTRATIVE | Facility: HOSPITAL | Age: 72
End: 2022-04-05

## 2022-04-05 DIAGNOSIS — Z12.31 VISIT FOR SCREENING MAMMOGRAM: Primary | ICD-10-CM

## 2022-05-23 ENCOUNTER — HOSPITAL ENCOUNTER (OUTPATIENT)
Dept: MAMMOGRAPHY | Facility: HOSPITAL | Age: 72
Discharge: HOME | End: 2022-05-23
Admitting: OBSTETRICS & GYNECOLOGY

## 2022-05-23 DIAGNOSIS — Z12.31 VISIT FOR SCREENING MAMMOGRAM: ICD-10-CM

## 2022-05-23 PROCEDURE — 77063 BREAST TOMOSYNTHESIS BI: CPT | Performed by: RADIOLOGY

## 2022-05-23 PROCEDURE — 77063 BREAST TOMOSYNTHESIS BI: CPT

## 2022-05-23 PROCEDURE — 77067 SCR MAMMO BI INCL CAD: CPT | Performed by: RADIOLOGY

## 2022-05-23 PROCEDURE — 77067 SCR MAMMO BI INCL CAD: CPT

## 2022-05-27 ENCOUNTER — OFFICE VISIT (OUTPATIENT)
Dept: OBSTETRICS AND GYNECOLOGY | Facility: CLINIC | Age: 72
End: 2022-05-27

## 2022-05-27 ENCOUNTER — TELEPHONE (OUTPATIENT)
Dept: OBSTETRICS AND GYNECOLOGY | Facility: CLINIC | Age: 72
End: 2022-05-27

## 2022-05-27 DIAGNOSIS — N89.8 VAGINAL LESION: ICD-10-CM

## 2022-05-27 DIAGNOSIS — N93.9 VAGINAL BLEEDING: Primary | ICD-10-CM

## 2022-05-27 PROCEDURE — 99213 OFFICE O/P EST LOW 20 MIN: CPT | Performed by: NURSE PRACTITIONER

## 2022-05-27 NOTE — PROGRESS NOTES
Chief Complaint   Patient presents with   • Follow-up   • Vaginal Bleeding   • Vaginitis         Subjective   HPI  Jia Mendoza is a 72 y.o. female, , who presents with evaluation of possible vaginal tear/sore inside the vaginal canal. Patient states the internal vaginal canal is sore. Patient states she had vaginal itching a couple days ago. Last PM she noticed scant bright red discharge when wiping x once. Patient denies vaginal itching, irritation, and vaginal discharge at this time.     She states she has experienced this problem for 2 days.  She describes the severity as moderate.  Patient notes aggravating factors include wiping and alleviating factors are none. The patient has not previously been evaluated for vaginitis. She denies new products or trauma to the area.    Patient is postmenopausal. Patient reports that she is not currently experiencing any symptoms of urinary incontinence.    Additional OB/GYN History   Last Pap :   Last Completed Pap Smear     This patient has no relevant Health Maintenance data.        History of abnormal Pap smear: no  OB History        2    Para   2    Term   2            AB        Living   2       SAB        IAB        Ectopic        Molar        Multiple   1    Live Births   2                The additional following portions of the patient's history were reviewed and updated as appropriate: allergies and current medications.    Review of Systems   Constitutional: Negative.    Genitourinary: Positive for vaginal bleeding, vaginal discharge and vaginal pain.   Psychiatric/Behavioral: Negative.      All other systems reviewed and are negative.     I have reviewed and agree with the HPI, ROS, and historical information as entered above. Jaja Agarwal, APRN    Objective   There were no vitals taken for this visit.    Physical Exam  Vitals and nursing note reviewed. Exam conducted with a chaperone present.   Constitutional:       Appearance: Normal  appearance. She is normal weight.   Genitourinary:     General: Normal vulva.      Labia:         Right: No rash, tenderness or lesion.         Left: Tenderness and lesion present. No rash.       Urethra: No prolapse, urethral pain, urethral swelling or urethral lesion.      Vagina: Normal. No signs of injury and foreign body. No vaginal discharge, erythema, tenderness, bleeding, lesions or prolapsed vaginal walls.      Cervix: Normal.      Uterus: Normal.       Adnexa: Right adnexa normal and left adnexa normal.      Rectum: Normal.      Comments: Approx 1cm round lesion with erythematous base noted at left outer opening of the vagina. No swelling or drainage noted. Area is tender to touch.  Neurological:      Mental Status: She is alert.         Assessment & Plan     Assessment and Plan    Problem List Items Addressed This Visit    None     Visit Diagnoses     Vaginal bleeding    -  Primary    Relevant Orders    Herpes Simplex Virus Culture - Swab, Vagina    Vaginal lesion        Relevant Orders    Anaerobic & Aerobic Culture ([facility] Only) - Swab, Vagina          1. Cultures done today. Will call pt with results  2. Keep area clean. Apply antibiotic ointment to lesion TID.   3. RTC for worsening or no improvement of symptoms.        Jaja Agarwal, APRN  05/27/2022

## 2022-05-29 LAB — HSV SPEC CULT: NEGATIVE

## 2022-06-01 LAB
BACTERIA SPEC AEROBE CULT: ABNORMAL
BACTERIA SPEC ANAEROBE CULT: ABNORMAL
BACTERIA SPEC CULT: ABNORMAL

## 2022-06-09 ENCOUNTER — TELEPHONE (OUTPATIENT)
Dept: OBSTETRICS AND GYNECOLOGY | Facility: CLINIC | Age: 72
End: 2022-06-09

## 2022-06-09 NOTE — TELEPHONE ENCOUNTER
Spoke with pt. Regarding culture results. Pt stated her vaginal lesion symptoms are completely resolved. She will notify the office if they return.

## 2022-06-29 ENCOUNTER — OFFICE VISIT (OUTPATIENT)
Dept: OBSTETRICS AND GYNECOLOGY | Facility: CLINIC | Age: 72
End: 2022-06-29

## 2022-06-29 VITALS
SYSTOLIC BLOOD PRESSURE: 132 MMHG | WEIGHT: 159.8 LBS | BODY MASS INDEX: 25.08 KG/M2 | DIASTOLIC BLOOD PRESSURE: 86 MMHG | HEIGHT: 67 IN

## 2022-06-29 DIAGNOSIS — N95.2 POSTMENOPAUSAL ATROPHIC VAGINITIS: ICD-10-CM

## 2022-06-29 DIAGNOSIS — Z01.419 WOMEN'S ANNUAL ROUTINE GYNECOLOGICAL EXAMINATION: Primary | ICD-10-CM

## 2022-06-29 DIAGNOSIS — Z85.3 PERSONAL HISTORY OF BREAST CANCER: ICD-10-CM

## 2022-06-29 PROCEDURE — 99397 PER PM REEVAL EST PAT 65+ YR: CPT | Performed by: OBSTETRICS & GYNECOLOGY

## 2022-06-29 RX ORDER — TRIAMCINOLONE ACETONIDE 55 UG/1
2 SPRAY, METERED NASAL DAILY
COMMUNITY

## 2022-06-29 NOTE — PROGRESS NOTES
GYN Annual Exam     CC - Here for annual exam.        HPI  Jia Mendoza is a 72 y.o. female, , who presents for annual well woman exam.  She is postmenopausal.  Patient denies vaginal bleeding. . Patient reports problems with: a sore on the inside of her labia. Patient had a similar place about a month ago and treated it with neosporin and it resolved. Patient states it re-popped up yesterday. Patient states it is just irritating. There were no changes to her medical or surgical history since her last visit. Partner Status: Marital Status: . She is sexually active. She has not had new partners since her last STD testing. She does not desire STD testing. STD testing recommendations have been explained to the patient. STD testing recommendations have been explained to the patient and she does not desire STD testing.    Patient is requesting a pap smear to be done this year.     Additional OB/GYN History   Current contraception: contraceptive methods: Post menopausal status  Desires to: do not start contraception  On HRT? No  Last Pap : 2021. Results: Negative   Last Completed Pap Smear     This patient has no relevant Health Maintenance data.        History of abnormal Pap smear: no  Family history of uterine, colon, breast, or ovarian cancer: yes - Cathy and Atul - breast cancer   Performs monthly Self-Breast Exam: yes  Last mammogram: 2022 Done at [facility].    Last Completed Mammogram          MAMMOGRAM (Yearly) Next due on 2022  Mammo Screening Digital Tomosynthesis Bilateral With CAD    2021  Mammo Screening Digital Tomosynthesis Bilateral With CAD    2020  Mammo Screening Digital Tomosynthesis Bilateral With CAD    2019  Mammo Screening Digital Tomosynthesis Bilateral With CAD    2018  Mammo Screening Digital Tomosynthesis Bilateral With CAD    Only the first 5 history entries have been loaded, but more history exists.              Last  colonoscopy:   Last Completed Colonoscopy          COLORECTAL CANCER SCREENING (COLONOSCOPY - Every 10 Years) Next due on 2015  COLONOSCOPY (Done - wnl)              Last DEXA: On 10/29/2020 and results were Osteopenia  Exercises Regularly: yes  Feelings of Anxiety or Depression: no      Tobacco Usage?: No   OB History        1    Para   1    Term   1       0    AB   0    Living   2       SAB   0    IAB   0    Ectopic   0    Molar   0    Multiple   1    Live Births   2                Health Maintenance   Topic Date Due   • ZOSTER VACCINE (1 of 2) Never done   • Pneumococcal Vaccine 65+ (1 - PCV) Never done   • HEPATITIS C SCREENING  Never done   • ANNUAL WELLNESS VISIT  Never done   • COVID-19 Vaccine (4 - Booster for Moderna series) 2022   • INFLUENZA VACCINE  10/01/2022   • MAMMOGRAM  2023   • DXA SCAN  2023   • COLORECTAL CANCER SCREENING  2025   • TDAP/TD VACCINES (2 - Td or Tdap) 2026       The additional following portions of the patient's history were reviewed and updated as appropriate: allergies, current medications, past family history, past medical history, past social history and past surgical history.    Review of Systems   Constitutional: Negative for chills, fatigue, unexpected weight gain and unexpected weight loss.   HENT: Negative for sore throat and swollen glands.    Respiratory: Negative for cough and shortness of breath.    Cardiovascular: Negative for chest pain, palpitations and leg swelling.   Gastrointestinal: Negative for abdominal distention, abdominal pain, blood in stool, constipation, diarrhea and nausea.   Endocrine: Negative for cold intolerance and heat intolerance.   Genitourinary: Negative for breast discharge, breast lump, frequency, hematuria, pelvic pain, vaginal bleeding, vaginal discharge and vaginal pain.   Musculoskeletal: Negative for gait problem and myalgias.   Allergic/Immunologic: Negative for  "immunocompromised state.   Neurological: Negative for dizziness, headache and confusion.   Psychiatric/Behavioral: Negative for suicidal ideas and depressed mood.       I have reviewed and agree with the HPI, ROS, and historical information as entered above. Ashtyn Thompson MD    Objective   /86 (BP Location: Left arm, Patient Position: Sitting, Cuff Size: Adult)   Ht 170.2 cm (67\")   Wt 72.5 kg (159 lb 12.8 oz)   BMI 25.03 kg/m²     Physical Exam  Vitals and nursing note reviewed. Exam conducted with a chaperone present.   Constitutional:       Appearance: She is well-developed.   HENT:      Head: Normocephalic and atraumatic.   Eyes:      Pupils: Pupils are equal, round, and reactive to light.   Neck:      Thyroid: No thyroid mass or thyromegaly.   Pulmonary:      Effort: Pulmonary effort is normal. No retractions.   Chest:      Chest wall: No mass.   Breasts:      Right: Normal. No mass, nipple discharge, skin change or tenderness.      Left: Normal. No mass, nipple discharge, skin change or tenderness.       Abdominal:      General: Bowel sounds are normal.      Palpations: Abdomen is soft. Abdomen is not rigid. There is no mass.      Tenderness: There is no abdominal tenderness. There is no guarding.      Hernia: No hernia is present. There is no hernia in the left inguinal area or right inguinal area.   Genitourinary:     Exam position: Lithotomy position.      Pubic Area: No rash.       Labia:         Right: No rash, tenderness or lesion.         Left: No rash, tenderness or lesion.       Urethra: No urethral pain or urethral swelling.      Vagina: Normal. No vaginal discharge or lesions.      Cervix: No cervical motion tenderness, discharge, lesion or cervical bleeding.      Uterus: Normal. Not enlarged, not fixed and not tender.       Adnexa:         Right: No mass, tenderness or fullness.          Left: No mass, tenderness or fullness.        Rectum: No external hemorrhoid.   Musculoskeletal:      " Cervical back: Normal range of motion. No muscular tenderness.      Right lower leg: No edema.      Left lower leg: No edema.   Skin:     General: Skin is warm and dry.   Neurological:      Mental Status: She is alert and oriented to person, place, and time.      Motor: Motor function is intact.   Psychiatric:         Mood and Affect: Mood and affect normal.         Behavior: Behavior normal.            Assessment and Plan    Problem List Items Addressed This Visit    None     Visit Diagnoses     Women's annual routine gynecological examination    -  Primary    Relevant Orders    LIQUID-BASED PAP SMEAR, [facility] ([facility])    Personal history of breast cancer        Relevant Orders    LIQUID-BASED PAP SMEAR, [facility] ([facility])    Postmenopausal atrophic vaginitis               1. GYN annual well woman exam.   2. Reviewed monthly self breast exams.  Instructed to call with lumps, pain, or breast discharge.  Yearly mammograms ordered.  3. Reviewed exercise as a preventative health measures.   4. Reccommended Flu Vaccine in Fall of each year.  5. RTC in 1 year or PRN with problems.  6. Return in about 1 year (around 6/29/2023) for Annual physical with dexa.     Ashtyn Thompson MD  06/29/2022

## 2022-07-01 LAB — REF LAB TEST METHOD: NORMAL

## 2023-04-13 ENCOUNTER — TRANSCRIBE ORDERS (OUTPATIENT)
Dept: ADMINISTRATIVE | Facility: HOSPITAL | Age: 73
End: 2023-04-13
Payer: MEDICARE

## 2023-04-13 DIAGNOSIS — Z12.31 VISIT FOR SCREENING MAMMOGRAM: ICD-10-CM

## 2023-05-26 ENCOUNTER — HOSPITAL ENCOUNTER (OUTPATIENT)
Dept: MAMMOGRAPHY | Facility: HOSPITAL | Age: 73
Discharge: HOME | End: 2023-05-26
Admitting: OBSTETRICS & GYNECOLOGY
Payer: MEDICARE

## 2023-05-26 DIAGNOSIS — Z12.31 VISIT FOR SCREENING MAMMOGRAM: ICD-10-CM

## 2023-05-26 PROCEDURE — 77067 SCR MAMMO BI INCL CAD: CPT

## 2023-05-26 PROCEDURE — 77063 BREAST TOMOSYNTHESIS BI: CPT

## 2023-07-12 PROBLEM — Z86.711 HISTORY OF PULMONARY EMBOLISM: Status: ACTIVE | Noted: 2023-07-12

## 2023-08-10 ENCOUNTER — TELEPHONE (OUTPATIENT)
Dept: OBSTETRICS AND GYNECOLOGY | Facility: CLINIC | Age: 73
End: 2023-08-10
Payer: MEDICARE

## 2023-08-10 NOTE — TELEPHONE ENCOUNTER
Patient states she is a patient of Dr Thompson and she had a Dexa scan done at [facility] last week. The results are uploaded under the media tab. She is wanting to see if Dr Thompson could review the report so that we can let her know if she needs to be doing anything additional. Advised pt we would need to reach out to Dr Thompson and get back with her once she reviews the report. Pt BRITTANIE.      ODIN Aquino

## 2023-08-16 ENCOUNTER — TELEPHONE (OUTPATIENT)
Dept: OBSTETRICS AND GYNECOLOGY | Facility: CLINIC | Age: 73
End: 2023-08-16
Payer: MEDICARE

## 2023-08-16 NOTE — TELEPHONE ENCOUNTER
Patient called requesting her Bone Density Results. I have consulted with both Dr Thompson and Isela García. Patient has Osteopenia and needs to be on 2000IU a day of vitamin D and 600mg calcium, and do some weight bearing exercises. Patient v/u.

## 2024-04-05 ENCOUNTER — TRANSCRIBE ORDERS (OUTPATIENT)
Dept: ADMINISTRATIVE | Facility: HOSPITAL | Age: 74
End: 2024-04-05
Payer: MEDICARE

## 2024-04-05 DIAGNOSIS — Z12.31 SCREENING MAMMOGRAM FOR BREAST CANCER: Primary | ICD-10-CM

## 2024-05-28 ENCOUNTER — HOSPITAL ENCOUNTER (OUTPATIENT)
Dept: MAMMOGRAPHY | Facility: HOSPITAL | Age: 74
Discharge: HOME | End: 2024-05-28
Admitting: OBSTETRICS & GYNECOLOGY
Payer: MEDICARE

## 2024-05-28 DIAGNOSIS — Z12.31 SCREENING MAMMOGRAM FOR BREAST CANCER: ICD-10-CM

## 2024-05-28 PROCEDURE — 77063 BREAST TOMOSYNTHESIS BI: CPT

## 2024-05-28 PROCEDURE — 77067 SCR MAMMO BI INCL CAD: CPT

## 2024-07-15 ENCOUNTER — TELEPHONE (OUTPATIENT)
Dept: ONCOLOGY | Facility: CLINIC | Age: 74
End: 2024-07-15
Payer: MEDICARE

## 2024-07-15 NOTE — TELEPHONE ENCOUNTER
Caller: Jia Mendoza    Relationship: Self    Best call back number: [phone]    What is the best time to reach you: ANY    Who are you requesting to speak with (clinical staff, provider,  specific staff member): CLINICAL       What was the call regarding: JIA IS CALLING SHE WAS A FORMER PATIENT OF DR BARTON    SHE WAS WANTING TO KNOW IF IT IS OK TO GET A BLOOD DRAW FORM THE ARM HER LYMPH NODES WERE REMOVED     THEY WERE REMOVED IN 2015      PLEASE ADVISE

## 2024-07-15 NOTE — TELEPHONE ENCOUNTER
Patient states she has no good veins in her unaffected arm and has better veins in the surgical side.  She only had sentinel node testing done in 2015 and so I told her that she can use the affected arm occasionally.  She verbalized understanding.

## 2024-07-17 ENCOUNTER — OFFICE VISIT (OUTPATIENT)
Dept: OBSTETRICS AND GYNECOLOGY | Facility: CLINIC | Age: 74
End: 2024-07-17
Payer: MEDICARE

## 2024-07-17 VITALS
DIASTOLIC BLOOD PRESSURE: 80 MMHG | HEIGHT: 66 IN | WEIGHT: 162.4 LBS | BODY MASS INDEX: 26.1 KG/M2 | SYSTOLIC BLOOD PRESSURE: 132 MMHG

## 2024-07-17 DIAGNOSIS — Z01.419 WOMEN'S ANNUAL ROUTINE GYNECOLOGICAL EXAMINATION: Primary | ICD-10-CM

## 2024-07-17 DIAGNOSIS — Z12.31 ENCOUNTER FOR SCREENING MAMMOGRAM FOR MALIGNANT NEOPLASM OF BREAST: ICD-10-CM

## 2024-07-17 RX ORDER — FUROSEMIDE 20 MG/1
20 TABLET ORAL DAILY
COMMUNITY
Start: 2024-06-27

## 2024-07-17 RX ORDER — ATORVASTATIN CALCIUM 10 MG/1
10 TABLET, FILM COATED ORAL DAILY
COMMUNITY
Start: 2024-06-12 | End: 2024-12-09

## 2024-07-17 NOTE — PROGRESS NOTES
Postmenopausal visit    Chief Complaint   Patient presents with    Postmenopausal Visit        Subjective     HPI  Jia Mendoza is a 74 y.o. female, , who presents as a(n) established patient. She is postmenopausal. There were no changes to her medical or surgical history since her last visit.. Marital Status: .  She is sexually active. She has not had new partners.. STD testing recommendations have been explained to the patient and she declines STD testing.    Patient reports problems with:  none .  Pt. reports no urinary incontinence.     Additional OB/GYN History     On HRT? No    Last Pap :23. Results: negative. HPV:  not done . Desires pap smear despite guidelines.   =  Last Completed Pap Smear       This patient has no relevant Health Maintenance data.          History of abnormal Pap smear: no  Family history of uterine, colon, breast, or ovarian cancer: yes - Patient has a history of breast cancer in right breast- lumpectomy and Tamoxifen for 5 years. Maternal aunt and niece had breast cancer   Performs monthly Self-Breast Exam: yes  Last mammogram: 24. Done at .    Last Completed Mammogram            Ordered - MAMMOGRAM (Yearly) Ordered on 2024  Mammo Screening Digital Tomosynthesis Bilateral With CAD    2023  Mammo Screening Digital Tomosynthesis Bilateral With CAD    2022  Mammo Screening Digital Tomosynthesis Bilateral With CAD    2021  Mammo Screening Digital Tomosynthesis Bilateral With CAD    2020  Mammo Screening Digital Tomosynthesis Bilateral With CAD    Only the first 5 history entries have been loaded, but more history exists.                  Last colonoscopy: has had a colonoscopy 9 years ago    Last Completed Colonoscopy            COLORECTAL CANCER SCREENING (COLONOSCOPY - Every 10 Years) Next due on 2015  Outside Procedure: COLONOSCOPY    2015  COLONOSCOPY (Done - wnl)    2015   Outside Procedure: COLONOSCOPY                  Her last bone density scan was 1 year ago and results were Osteopenia  Exercises Regularly: yes  Feelings of Anxiety or Depression: no    Tobacco Usage?: No       Current Outpatient Medications:     aspirin 81 MG EC tablet, Take 1 tablet by mouth Daily., Disp: , Rfl:     atorvastatin (LIPITOR) 10 MG tablet, Take 1 tablet by mouth Daily., Disp: , Rfl:     B Complex-C-Folic Acid (HM SUPER VITAMIN B COMPLEX/C PO), Take 1 tablet by mouth Daily., Disp: , Rfl:     Calcium Carbonate (CALTRATE 600 PO), Take 1 tablet by mouth Daily., Disp: , Rfl:     Cetirizine HCl (ZYRTEC PO), Take 1 tablet by mouth Daily., Disp: , Rfl:     Cholecalciferol (VITAMIN D PO), Take 1 tablet by mouth Daily. 1000units, Disp: , Rfl:     furosemide (LASIX) 20 MG tablet, Take 1 tablet by mouth Daily., Disp: , Rfl:     Triamcinolone Acetonide (NASACORT) 55 MCG/ACT nasal inhaler, 2 sprays into the nostril(s) as directed by provider Daily., Disp: , Rfl:     Patient denies the need for medication refills today.    OB History          1    Para   1    Term   1       0    AB   0    Living   2         SAB   0    IAB   0    Ectopic   0    Molar   0    Multiple   1    Live Births   2                Past Medical History:   Diagnosis Date    Ductal carcinoma of breast, stage 1     Right - Age 65    Menopause     Pulmonary embolus     Dx in [facility] in 10/22        Past Surgical History:   Procedure Laterality Date    BREAST BIOPSY Right 2015    BREAST LUMPECTOMY Right 2015    HEMORROIDECTOMY      TUBAL ABDOMINAL LIGATION      WRIST FRACTURE SURGERY Right 2016    [facility]  Dr. Lexa Brambila       Health Maintenance   Topic Date Due    BMI FOLLOWUP  Never done    ZOSTER VACCINE (1 of 2) Never done    Pneumococcal Vaccine 65+ (1 of 1 - PCV) Never done    HEPATITIS C SCREENING  Never done    ANNUAL WELLNESS VISIT  Never done    COVID-19 Vaccine (5 -  "2023-24 season) 09/01/2023    INFLUENZA VACCINE  08/01/2024    COLORECTAL CANCER SCREENING  05/22/2025    MAMMOGRAM  05/28/2025    DXA SCAN  08/03/2025    TDAP/TD VACCINES (2 - Td or Tdap) 01/26/2026       The additional following portions of the patient's history were reviewed and updated as appropriate: allergies, current medications, past family history, past medical history, past social history, past surgical history, and problem list.    Review of Systems    I have reviewed and agree with the HPI, ROS, and historical information as entered above. Ashtyn Thompson MD           Objective   /80   Ht 167.6 cm (66\")   Wt 73.7 kg (162 lb 6.4 oz)   BMI 26.21 kg/m²     Physical Exam  Vitals and nursing note reviewed. Exam conducted with a chaperone present.   Constitutional:       Appearance: She is well-developed.   HENT:      Head: Normocephalic and atraumatic.   Eyes:      Pupils: Pupils are equal, round, and reactive to light.   Neck:      Thyroid: No thyroid mass or thyromegaly.   Pulmonary:      Effort: Pulmonary effort is normal. No retractions.   Chest:      Chest wall: No mass.   Breasts:     Right: Normal. No mass, nipple discharge, skin change or tenderness.      Left: Normal. No mass, nipple discharge, skin change or tenderness.   Abdominal:      General: Bowel sounds are normal.      Palpations: Abdomen is soft. Abdomen is not rigid. There is no mass.      Tenderness: There is no abdominal tenderness. There is no guarding.      Hernia: No hernia is present. There is no hernia in the left inguinal area or right inguinal area.   Genitourinary:     Exam position: Lithotomy position.      Pubic Area: No rash.       Labia:         Right: No rash, tenderness or lesion.         Left: No rash, tenderness or lesion.       Urethra: No urethral pain or urethral swelling.      Vagina: Normal. No vaginal discharge or lesions.      Cervix: No cervical motion tenderness, discharge, lesion or cervical bleeding.      " Uterus: Normal. Not enlarged, not fixed and not tender.       Adnexa:         Right: No mass, tenderness or fullness.          Left: No mass, tenderness or fullness.        Rectum: No external hemorrhoid.   Musculoskeletal:      Cervical back: Normal range of motion. No muscular tenderness.      Right lower leg: No edema.      Left lower leg: No edema.   Skin:     General: Skin is warm and dry.   Neurological:      Mental Status: She is alert and oriented to person, place, and time.      Motor: Motor function is intact.   Psychiatric:         Mood and Affect: Mood and affect normal.         Behavior: Behavior normal.         Assessment and Plan         Problem List Items Addressed This Visit    None  Visit Diagnoses       Women's annual routine gynecological examination    -  Primary    Relevant Orders    LIQUID-BASED PAP SMEAR WITH HPV GENOTYPING IF ASCUS ([facility])    Mammo Screening Digital Tomosynthesis Bilateral With CAD    Encounter for screening mammogram for malignant neoplasm of breast        Relevant Orders    Mammo Screening Digital Tomosynthesis Bilateral With CAD            Reviewed monthly self breast exams.  Instructed to call with lumps, pain, or breast discharge.  Yearly mammograms ordered.  Recommended use of Vitamin D and getting adequate calcium in her diet. (1500mg)  Reviewed exercise as a preventative health measures.   Recommended Flu Vaccine in Fall of each year.  RTC in 1 year or PRN with problems.  Return in about 1 year (around 7/17/2025) for Annual physical.    Ashtyn Thompson MD  07/17/2024

## 2024-07-22 LAB — REF LAB TEST METHOD: NORMAL

## 2024-11-06 ENCOUNTER — OFFICE VISIT (OUTPATIENT)
Dept: OBSTETRICS AND GYNECOLOGY | Facility: CLINIC | Age: 74
End: 2024-11-06
Payer: MEDICARE

## 2024-11-06 VITALS
BODY MASS INDEX: 26.71 KG/M2 | HEIGHT: 66 IN | DIASTOLIC BLOOD PRESSURE: 88 MMHG | WEIGHT: 166.2 LBS | SYSTOLIC BLOOD PRESSURE: 140 MMHG

## 2024-11-06 DIAGNOSIS — R93.89 THICKENED ENDOMETRIUM: Primary | ICD-10-CM

## 2024-11-06 RX ORDER — MELOXICAM 7.5 MG/1
1 TABLET ORAL EVERY 12 HOURS SCHEDULED
COMMUNITY
Start: 2024-08-27

## 2024-11-06 NOTE — PROGRESS NOTES
"     Gynecologic Procedure Note        Endometrial Biopsy      Indications: Jia Mendoza is a 74 y.o. , who presents today for endometrial biopsy.  The patient was noted to have  Endometrial thickness of 1.4cm seen on TVUS on 24 at a [place] ovarian cancer screening. Her endometrial thickness was 0.6cm previously in 2023.   .  Her LMP is No LMP recorded. Patient is postmenopausal. . After being presented with the risk, benefits, and specific detail of the procedure, the patient wished to proceed.  Written consent was obtained from patient.   Urine pregnancy test was Not indicated. Patient does not have an allergy to betadine or shellfish.     Procedure Details     Time out: immediate members of the procedure team and patient agree to the following: correct patient, correct site, correct procedure to be performed. Ashtyn Thompson MD      The patient was placed on the table in the dorsal lithotomy position.  She was draped in the appropriate manner.  A speculum was placed in the vagina.  The cervix was visualized and prepped with Betadine.  A tenaculum was placed on the anterior lip of the cervix for traction.  A small plastic 5 mm Pipelle syringe curette was inserted into the cervical canal.  The uterus was sounded to 7 cm's.  A vigorous four quadrant biopsy was performed, removing a small amount of tissue.  The tissue was placed in Formalin and sent to Pathology.  The patient tolerated the procedure well and she reported mild cramping.  The tenaculum was removed from the cervix and the speculum was removed.  The patient was observed for 10 minutes.           Complications: none.     Procedures    Review of Systems  /88   Ht 167.6 cm (66\")   Wt 75.4 kg (166 lb 3.2 oz)   BMI 26.83 kg/m²       Plan:  No orders of the defined types were placed in this encounter.      Problem List Items Addressed This Visit    None  Visit Diagnoses       Thickened endometrium    -  Primary    Relevant Orders    TISSUE EXAM, " [facility] ([facility])                Instructions  Call the office in 5 business days for biopsy results.  Patient instructed to call the office if develops a fever of 100.4 or greater, vaginal bleeding heavier than a period, foul vaginal discharge or pain.     Ashtyn Thompson MD  11/06/2024

## 2024-11-08 LAB — REF LAB TEST METHOD: NORMAL

## 2024-11-20 ENCOUNTER — PROCEDURE VISIT (OUTPATIENT)
Dept: OBSTETRICS AND GYNECOLOGY | Facility: CLINIC | Age: 74
End: 2024-11-20
Payer: MEDICARE

## 2024-11-20 VITALS
WEIGHT: 163.6 LBS | HEIGHT: 66 IN | BODY MASS INDEX: 26.29 KG/M2 | SYSTOLIC BLOOD PRESSURE: 128 MMHG | DIASTOLIC BLOOD PRESSURE: 86 MMHG

## 2024-11-20 DIAGNOSIS — R93.89 THICKENED ENDOMETRIUM: Primary | ICD-10-CM

## 2024-11-20 NOTE — PROGRESS NOTES
"     Gynecologic Procedure Note        Endometrial Biopsy      Indications: Jia Mendoza is a 74 y.o. , who presents today for  repeat endometrial biopsy. Patient had EMB on 2024 that showed predominantly mucus and scant fragments of reactive squamous epithelium and no endometrial tissue identified.   The patient was noted to have  Endometrial thickness of 1.4cm seen on TVUS on 24 at a [facility] ovarian cancer screening. Her endometrial thickness was 0.6cm previously in 2023.  Patient is postmenopausal. . After being presented with the risk, benefits, and specific detail of the procedure, the patient wished to proceed.  Written consent was obtained from patient.   Urine pregnancy test was Not indicated. Patient does not have an allergy to betadine or shellfish.     Procedure Details     Time out: immediate members of the procedure team and patient agree to the following: correct patient, correct site, correct procedure to be performed. Ashtyn Thompson MD      The patient was placed on the table in the dorsal lithotomy position.  She was draped in the appropriate manner.  A speculum was placed in the vagina.  The cervix was visualized and prepped with Betadine.  A tenaculum was placed on the anterior lip of the cervix for traction.  A small plastic 5 mm Pipelle syringe curette was inserted into the cervical canal.  The uterus was sounded to 7 cm's.  A vigorous four quadrant biopsy was performed, removing a small amount of tissue.  The tissue was placed in Formalin and sent to Pathology.  The patient tolerated the procedure well and she reported mild cramping.  The tenaculum was removed from the cervix and the speculum was removed.  The patient was observed for 10 minutes.           Complications: none.     Procedures    Review of Systems  /86   Ht 167.6 cm (65.98\")   Wt 74.2 kg (163 lb 9.6 oz)   BMI 26.42 kg/m²       Plan:  No orders of the defined types were placed in this encounter.      Problem " List Items Addressed This Visit    None  Visit Diagnoses       Thickened endometrium    -  Primary    Relevant Orders    TISSUE EXAM, [facility] ([facility])                Instructions  Call the office in 5 business days for biopsy results.  Patient instructed to call the office if develops a fever of 100.4 or greater, vaginal bleeding heavier than a period, foul vaginal discharge or pain.     Ashtyn Thompson MD  11/20/2024

## 2024-11-22 LAB — REF LAB TEST METHOD: NORMAL

## 2025-04-16 ENCOUNTER — TRANSCRIBE ORDERS (OUTPATIENT)
Dept: ADMINISTRATIVE | Facility: HOSPITAL | Age: 75
End: 2025-04-16
Payer: MEDICARE

## 2025-04-16 ENCOUNTER — HOSPITAL ENCOUNTER (OUTPATIENT)
Dept: CT IMAGING | Facility: HOSPITAL | Age: 75
Discharge: HOME | End: 2025-04-16
Admitting: PHYSICIAN ASSISTANT
Payer: MEDICARE

## 2025-04-16 DIAGNOSIS — M25.551 RIGHT HIP PAIN: Primary | ICD-10-CM

## 2025-04-16 DIAGNOSIS — M25.551 RIGHT HIP PAIN: ICD-10-CM

## 2025-04-16 PROCEDURE — 72192 CT PELVIS W/O DYE: CPT

## 2025-05-16 LAB — NCCN CRITERIA FLAG: NORMAL

## 2025-05-30 ENCOUNTER — HOSPITAL ENCOUNTER (OUTPATIENT)
Dept: MAMMOGRAPHY | Facility: HOSPITAL | Age: 75
Discharge: HOME | End: 2025-05-30
Admitting: OBSTETRICS & GYNECOLOGY
Payer: MEDICARE

## 2025-05-30 DIAGNOSIS — Z01.419 WOMEN'S ANNUAL ROUTINE GYNECOLOGICAL EXAMINATION: ICD-10-CM

## 2025-05-30 DIAGNOSIS — Z12.31 ENCOUNTER FOR SCREENING MAMMOGRAM FOR MALIGNANT NEOPLASM OF BREAST: ICD-10-CM

## 2025-05-30 PROCEDURE — 77067 SCR MAMMO BI INCL CAD: CPT

## 2025-05-30 PROCEDURE — 77063 BREAST TOMOSYNTHESIS BI: CPT

## 2025-06-10 ENCOUNTER — TRANSCRIBE ORDERS (OUTPATIENT)
Dept: ADMINISTRATIVE | Facility: HOSPITAL | Age: 75
End: 2025-06-10
Payer: MEDICARE

## 2025-06-10 DIAGNOSIS — R05.3 CHRONIC COUGH: Primary | ICD-10-CM

## 2025-06-19 ENCOUNTER — HOSPITAL ENCOUNTER (OUTPATIENT)
Dept: PULMONOLOGY | Facility: HOSPITAL | Age: 75
Discharge: HOME | End: 2025-06-19
Admitting: INTERNAL MEDICINE
Payer: MEDICARE

## 2025-06-19 DIAGNOSIS — R05.3 CHRONIC COUGH: ICD-10-CM

## 2025-06-19 PROCEDURE — 94729 DIFFUSING CAPACITY: CPT

## 2025-06-19 PROCEDURE — 94010 BREATHING CAPACITY TEST: CPT

## 2025-06-19 PROCEDURE — 94726 PLETHYSMOGRAPHY LUNG VOLUMES: CPT

## 2025-07-23 ENCOUNTER — OFFICE VISIT (OUTPATIENT)
Dept: OBSTETRICS AND GYNECOLOGY | Facility: CLINIC | Age: 75
End: 2025-07-23
Payer: MEDICARE

## 2025-07-23 VITALS
DIASTOLIC BLOOD PRESSURE: 98 MMHG | SYSTOLIC BLOOD PRESSURE: 138 MMHG | HEIGHT: 66 IN | BODY MASS INDEX: 26.84 KG/M2 | WEIGHT: 167 LBS

## 2025-07-23 DIAGNOSIS — Z17.0 MALIGNANT NEOPLASM OF RIGHT BREAST IN FEMALE, ESTROGEN RECEPTOR POSITIVE, UNSPECIFIED SITE OF BREAST: ICD-10-CM

## 2025-07-23 DIAGNOSIS — Z86.711 HISTORY OF PULMONARY EMBOLISM: ICD-10-CM

## 2025-07-23 DIAGNOSIS — N95.8 OTHER SPECIFIED MENOPAUSAL AND PERIMENOPAUSAL DISORDERS: ICD-10-CM

## 2025-07-23 DIAGNOSIS — C50.911 MALIGNANT NEOPLASM OF RIGHT BREAST IN FEMALE, ESTROGEN RECEPTOR POSITIVE, UNSPECIFIED SITE OF BREAST: ICD-10-CM

## 2025-07-23 DIAGNOSIS — Z01.419 WOMEN'S ANNUAL ROUTINE GYNECOLOGICAL EXAMINATION: Primary | ICD-10-CM

## 2025-07-23 RX ORDER — FAMOTIDINE 20 MG/1
20 TABLET, FILM COATED ORAL NIGHTLY
COMMUNITY

## 2025-07-23 RX ORDER — FLUTICASONE PROPIONATE 50 MCG
1 SPRAY, SUSPENSION (ML) NASAL DAILY
COMMUNITY

## 2025-07-23 RX ORDER — ATORVASTATIN CALCIUM 10 MG/1
10 TABLET, FILM COATED ORAL DAILY
COMMUNITY
Start: 2025-03-14 | End: 2026-07-09

## 2025-07-23 RX ORDER — HYDROCHLOROTHIAZIDE 12.5 MG/1
12.5 TABLET ORAL DAILY
COMMUNITY
Start: 2025-06-17 | End: 2025-11-08

## 2025-07-23 NOTE — PROGRESS NOTES
Gynecologic Annual Exam Note        GYN Annual Exam     Chief Complaint   Patient presents with    Gynecologic Exam        Subjective     HPI  Jia Mendoza is a 75 y.o. female, , who presents for annual well woman exam as a(n) established patient.  She is postmenopausal.  Patient denies vaginal bleeding. . Since her last visit the patient was diagnosed with a pelvic fracture in 2025. Marital Status: .  She is sexually active. She has not had new partners. STD testing recommendations have been explained to the patient and she declines STD testing.    The patient would like to discuss the following complaints today: after pelvic fracture was told she needs to repeat Dexa scan. Last one in  and showed osteopenia.   Pt. reports no urinary incontinence.     Additional OB/GYN History     On HRT? No    Last Pap : 24. Results: negative. HPV: not done. Would like pap smear despite guidelines    Last Completed Pap Smear    This patient has no relevant Health Maintenance data.       History of abnormal Pap smear: no  Family history of uterine, colon, breast, or ovarian cancer: yes - pt- breast, maternal aunt and niece- breast   Performs monthly Self-Breast Exam: yes  Last mammogram: 25. Done at . There is a copy in the chart.    Last Completed Mammogram            Completed or No Longer Recommended       MAMMOGRAM  Discontinued        Frequency changed to Never automatically (Topic No Longer Applies)    2025  Mammo Screening Digital Tomosynthesis Bilateral With CAD    2024  Mammo Screening Digital Tomosynthesis Bilateral With CAD    2023  Mammo Screening Digital Tomosynthesis Bilateral With CAD    2022  Mammo Screening Digital Tomosynthesis Bilateral With CAD     Only the first 5 history entries have been loaded, but more history exists.                        Last colonoscopy: has had a colonoscopy 10 years ago, she has ordered a Cologuard    Last Completed  Colonoscopy    This patient has no relevant Health Maintenance data.         Her last bone density scan was 2 years ago and results were Osteopenia  Exercises Regularly: yes as much as possible with fracture  Feelings of Anxiety or Depression: no      Tobacco Usage?: No       Current Outpatient Medications:     aspirin 81 MG EC tablet, Take 1 tablet by mouth Daily., Disp: , Rfl:     B Complex-C-Folic Acid (HM SUPER VITAMIN B COMPLEX/C PO), Take 1 tablet by mouth Daily., Disp: , Rfl:     Calcium Carbonate (CALTRATE 600 PO), Take 1 tablet by mouth Daily., Disp: , Rfl:     Cetirizine HCl (ZYRTEC PO), Take 1 tablet by mouth Daily., Disp: , Rfl:     Cholecalciferol (VITAMIN D PO), Take 1 tablet by mouth Daily. 1000units, Disp: , Rfl:     famotidine (PEPCID) 20 MG tablet, Take 1 tablet by mouth Every Night., Disp: , Rfl:     fluticasone (FLONASE) 50 MCG/ACT nasal spray, 1 spray by Each Nare route Daily., Disp: , Rfl:     hydroCHLOROthiazide 12.5 MG tablet, Take 1 tablet by mouth Daily., Disp: , Rfl:     Misc Natural Products (TURMERIC, CURCUMIN, PO), Take  by mouth., Disp: , Rfl:     atorvastatin (LIPITOR) 10 MG tablet, Take 1 tablet by mouth Daily. (Patient not taking: Reported on 2025), Disp: , Rfl:     Patient denies the need for medication refills today.    OB History          1    Para   1    Term   1       0    AB   0    Living   2         SAB   0    IAB   0    Ectopic   0    Molar   0    Multiple   1    Live Births   2                Past Medical History:   Diagnosis Date    Ductal carcinoma of breast, stage 1     Right - Age 65    Menopause     Pelvic fracture     Pulmonary embolus     Dx in [facility] in 10/22        Past Surgical History:   Procedure Laterality Date    BREAST BIOPSY Right 2015    BREAST LUMPECTOMY Right 2015    HEMORROIDECTOMY  2012    TUBAL ABDOMINAL LIGATION      WRIST FRACTURE SURGERY Right 2016    [facility]  Dr. Lexa Brambila  "      Health Maintenance   Topic Date Due    ZOSTER VACCINE (1 of 2) Never done    HEPATITIS C SCREENING  Never done    ANNUAL WELLNESS VISIT  Never done    COVID-19 Vaccine (5 - 2024-25 season) 09/01/2024    RSV Vaccine - Adults (1 - 1-dose 75+ series) Never done    DXA SCAN  08/03/2025    COLORECTAL CANCER SCREENING  09/21/2025    INFLUENZA VACCINE  10/01/2025    TDAP/TD VACCINES (2 - Td or Tdap) 01/26/2026    Pneumococcal Vaccine 50+  Completed    MAMMOGRAM  Discontinued       The additional following portions of the patient's history were reviewed and updated as appropriate: allergies, current medications, past family history, past medical history, past social history, past surgical history, and problem list.    Review of Systems   Constitutional: Negative.    HENT: Negative.     Eyes: Negative.    Respiratory: Negative.     Cardiovascular: Negative.    Gastrointestinal: Negative.    Endocrine: Negative.    Genitourinary: Negative.    Musculoskeletal: Negative.    Skin: Negative.    Allergic/Immunologic: Negative.    Neurological: Negative.    Hematological: Negative.    Psychiatric/Behavioral: Negative.           I have reviewed and agree with the HPI, ROS, and historical information as entered above. Ashtyn Thompson MD           Objective   /98   Ht 167.6 cm (65.98\")   Wt 75.8 kg (167 lb)   BMI 26.97 kg/m²     Physical Exam  Vitals and nursing note reviewed. Exam conducted with a chaperone present.   Constitutional:       Appearance: She is well-developed.   HENT:      Head: Normocephalic and atraumatic.   Eyes:      Pupils: Pupils are equal, round, and reactive to light.   Neck:      Thyroid: No thyroid mass or thyromegaly.   Pulmonary:      Effort: Pulmonary effort is normal. No retractions.   Chest:      Chest wall: No mass.   Breasts:     Right: Normal. No mass, nipple discharge, skin change or tenderness.      Left: Normal. No mass, nipple discharge, skin change or tenderness.   Abdominal:      " General: Bowel sounds are normal.      Palpations: Abdomen is soft. Abdomen is not rigid. There is no mass.      Tenderness: There is no abdominal tenderness. There is no guarding.      Hernia: No hernia is present. There is no hernia in the left inguinal area or right inguinal area.   Genitourinary:     Exam position: Lithotomy position.      Pubic Area: No rash.       Labia:         Right: No rash, tenderness or lesion.         Left: No rash, tenderness or lesion.       Urethra: No urethral pain or urethral swelling.      Vagina: Normal. No vaginal discharge or lesions.      Cervix: No cervical motion tenderness, discharge, lesion or cervical bleeding.      Uterus: Normal. Not enlarged, not fixed and not tender.       Adnexa:         Right: No mass, tenderness or fullness.          Left: No mass, tenderness or fullness.        Rectum: No external hemorrhoid.   Musculoskeletal:      Cervical back: Normal range of motion. No muscular tenderness.      Right lower leg: No edema.      Left lower leg: No edema.   Skin:     General: Skin is warm and dry.   Neurological:      Mental Status: She is alert and oriented to person, place, and time.      Motor: Motor function is intact.   Psychiatric:         Mood and Affect: Mood and affect normal.         Behavior: Behavior normal.          Assessment and Plan    Problem List Items Addressed This Visit          Gynecologic and Obstetric Problems    Malignant neoplasm of right female breast          Overview            History of pulmonary embolism     Other Visit Diagnoses         Women's annual routine gynecological examination    -  Primary    Relevant Orders    LIQUID-BASED PAP SMEAR WITH HPV GENOTYPING IF ASCUS ([facility])    DEXA Bone Density Axial      Other specified menopausal and perimenopausal disorders        Relevant Orders    DEXA Bone Density Axial            GYN annual well woman exam.   Reviewed monthly self breast exams.  Instructed to call with lumps,  pain, or breast discharge.  Yearly mammograms ordered.  Recommended use of Vitamin D and getting adequate calcium in her diet. (1500mg)  Osteoporosis screening ordered today.  Reviewed exercise as a preventative health measures.   Return in about 1 year (around 7/23/2026) for Annual physical, Schedule DEXA.    Ashtyn Thompson MD  07/23/2025

## 2025-07-24 LAB — REF LAB TEST METHOD: NORMAL

## 2025-08-12 ENCOUNTER — TELEPHONE (OUTPATIENT)
Dept: OBSTETRICS AND GYNECOLOGY | Facility: CLINIC | Age: 75
End: 2025-08-12
Payer: MEDICARE

## 2025-08-13 ENCOUNTER — TELEPHONE (OUTPATIENT)
Dept: OBSTETRICS AND GYNECOLOGY | Facility: CLINIC | Age: 75
End: 2025-08-13
Payer: MEDICARE

## 2025-08-27 ENCOUNTER — OFFICE VISIT (OUTPATIENT)
Dept: OBSTETRICS AND GYNECOLOGY | Facility: CLINIC | Age: 75
End: 2025-08-27
Payer: MEDICARE

## 2025-08-27 VITALS
BODY MASS INDEX: 27 KG/M2 | WEIGHT: 168 LBS | RESPIRATION RATE: 18 BRPM | DIASTOLIC BLOOD PRESSURE: 74 MMHG | SYSTOLIC BLOOD PRESSURE: 118 MMHG | HEIGHT: 66 IN

## 2025-08-27 DIAGNOSIS — R10.2 PELVIC CRAMPING: ICD-10-CM

## 2025-08-27 DIAGNOSIS — N95.1 MENOPAUSAL STATE: ICD-10-CM

## 2025-08-27 DIAGNOSIS — R10.2 PELVIC PAIN: Primary | ICD-10-CM

## 2025-08-27 LAB
BILIRUB BLD-MCNC: NEGATIVE MG/DL
CLARITY, POC: CLEAR
COLOR UR: YELLOW
GLUCOSE UR STRIP-MCNC: NEGATIVE MG/DL
KETONES UR QL: NEGATIVE
LEUKOCYTE EST, POC: NEGATIVE
NITRITE UR-MCNC: NEGATIVE MG/ML
PH UR: 6 [PH] (ref 5–8)
PROT UR STRIP-MCNC: NEGATIVE MG/DL
RBC # UR STRIP: NEGATIVE /UL
SP GR UR: 1.01 (ref 1–1.03)
UROBILINOGEN UR QL: NORMAL

## 2025-08-29 LAB
BACTERIA UR CULT: NORMAL
BACTERIA UR CULT: NORMAL